# Patient Record
Sex: FEMALE | Race: WHITE | NOT HISPANIC OR LATINO | URBAN - NONMETROPOLITAN AREA
[De-identification: names, ages, dates, MRNs, and addresses within clinical notes are randomized per-mention and may not be internally consistent; named-entity substitution may affect disease eponyms.]

---

## 2023-09-22 ENCOUNTER — OFFICE VISIT (OUTPATIENT)
Dept: URBAN - NONMETROPOLITAN AREA CLINIC 13 | Facility: CLINIC | Age: 78
End: 2023-09-22

## 2023-10-06 ENCOUNTER — LAB OUTSIDE AN ENCOUNTER (OUTPATIENT)
Dept: URBAN - NONMETROPOLITAN AREA CLINIC 13 | Facility: CLINIC | Age: 78
End: 2023-10-06

## 2023-10-06 ENCOUNTER — OFFICE VISIT (OUTPATIENT)
Dept: URBAN - NONMETROPOLITAN AREA CLINIC 13 | Facility: CLINIC | Age: 78
End: 2023-10-06
Payer: MEDICARE

## 2023-10-06 VITALS
HEART RATE: 71 BPM | SYSTOLIC BLOOD PRESSURE: 145 MMHG | HEIGHT: 64 IN | DIASTOLIC BLOOD PRESSURE: 82 MMHG | BODY MASS INDEX: 30.39 KG/M2 | WEIGHT: 178 LBS

## 2023-10-06 DIAGNOSIS — K76.0 HEPATIC STEATOSIS: ICD-10-CM

## 2023-10-06 DIAGNOSIS — R17 ELEVATED BILIRUBIN: ICD-10-CM

## 2023-10-06 PROCEDURE — 99204 OFFICE O/P NEW MOD 45 MIN: CPT

## 2023-10-06 RX ORDER — AMLODIPINE BESYLATE 10 MG/1
1 TABLET TABLET ORAL ONCE A DAY
Qty: 30 | Status: ACTIVE | COMMUNITY
Start: 2023-10-06

## 2023-10-06 RX ORDER — ALLOPURINOL 100 MG/1
1 TABLET TABLET ORAL ONCE A DAY
Qty: 30 | Status: ACTIVE | COMMUNITY
Start: 2023-10-06 | End: 2023-11-05

## 2023-10-06 RX ORDER — TELMISARTAN 40 MG/1
1 TABLET TABLET ORAL ONCE A DAY
Qty: 30 | Status: ACTIVE | COMMUNITY
Start: 2023-10-06

## 2023-10-06 RX ORDER — ESOMEPRAZOLE MAGNESIUM 40 MG/1
TAKE ONE CAPSULE BY MOUTH EVERY DAY CAPSULE, DELAYED RELEASE ORAL
Qty: 90 EACH | Refills: 3 | Status: ACTIVE | COMMUNITY

## 2023-10-06 RX ORDER — ATORVASTATIN CALCIUM 40 MG/1
TAKE ONE TABLET BY MOUTH EVERY NIGHT AT BEDTIME TABLET, FILM COATED ORAL
Qty: 90 EACH | Refills: 0 | Status: ACTIVE | COMMUNITY

## 2023-10-06 RX ORDER — FLUTICASONE FUROATE AND VILANTEROL TRIFENATATE 100; 25 UG/1; UG/1
1 PUFF POWDER RESPIRATORY (INHALATION) ONCE A DAY
Status: ACTIVE | COMMUNITY
Start: 2023-10-06

## 2023-10-06 RX ORDER — MONTELUKAST 10 MG/1
TABLET ORAL
Qty: 90 TABLET | Status: ACTIVE | COMMUNITY

## 2023-10-06 NOTE — HPI-TODAY'S VISIT:
10/6/2023 Mrs. Anthony presents for evaluation of elevated bilirubin. She has a medical history of PINO, HTN and elevated triglycerides. Surgical history includes hysterectomy and appendectomy. Labs in July of this year showed bilirubin 1.4 . This has been elevated in the past and she was directed to consult with GI. Today we discussed continuing with additional labwork for evaluation.  SAFIA

## 2024-01-05 ENCOUNTER — OFFICE VISIT (OUTPATIENT)
Dept: URBAN - NONMETROPOLITAN AREA CLINIC 13 | Facility: CLINIC | Age: 79
End: 2024-01-05

## 2024-01-12 ENCOUNTER — OFFICE VISIT (OUTPATIENT)
Dept: URBAN - NONMETROPOLITAN AREA CLINIC 13 | Facility: CLINIC | Age: 79
End: 2024-01-12

## 2024-02-02 ENCOUNTER — OV EP (OUTPATIENT)
Dept: URBAN - NONMETROPOLITAN AREA CLINIC 13 | Facility: CLINIC | Age: 79
End: 2024-02-02
Payer: MEDICARE

## 2024-02-02 VITALS
HEIGHT: 64 IN | SYSTOLIC BLOOD PRESSURE: 143 MMHG | BODY MASS INDEX: 31.55 KG/M2 | WEIGHT: 184.8 LBS | DIASTOLIC BLOOD PRESSURE: 84 MMHG | HEART RATE: 82 BPM

## 2024-02-02 DIAGNOSIS — R74.8 ELEVATED LIVER ENZYMES: ICD-10-CM

## 2024-02-02 DIAGNOSIS — Z12.11 SCREENING FOR COLON CANCER: ICD-10-CM

## 2024-02-02 DIAGNOSIS — R17 ELEVATED BILIRUBIN: ICD-10-CM

## 2024-02-02 DIAGNOSIS — K76.0 HEPATIC STEATOSIS: ICD-10-CM

## 2024-02-02 PROBLEM — 305058001: Status: ACTIVE | Noted: 2024-02-02

## 2024-02-02 PROBLEM — 26165005: Status: ACTIVE | Noted: 2023-10-06

## 2024-02-02 PROBLEM — 707724006: Status: ACTIVE | Noted: 2024-02-02

## 2024-02-02 PROBLEM — 197321007: Status: ACTIVE | Noted: 2023-10-06

## 2024-02-02 PROCEDURE — 99213 OFFICE O/P EST LOW 20 MIN: CPT

## 2024-02-02 RX ORDER — ESOMEPRAZOLE MAGNESIUM 40 MG/1
TAKE ONE CAPSULE BY MOUTH EVERY DAY CAPSULE, DELAYED RELEASE ORAL
Qty: 90 EACH | Refills: 3 | Status: ACTIVE | COMMUNITY

## 2024-02-02 RX ORDER — FLUTICASONE FUROATE AND VILANTEROL TRIFENATATE 100; 25 UG/1; UG/1
1 PUFF POWDER RESPIRATORY (INHALATION) ONCE A DAY
Status: ACTIVE | COMMUNITY
Start: 2023-10-06

## 2024-02-02 RX ORDER — AMLODIPINE BESYLATE 10 MG/1
1 TABLET TABLET ORAL ONCE A DAY
Qty: 30 | Status: ACTIVE | COMMUNITY
Start: 2023-10-06

## 2024-02-02 RX ORDER — TELMISARTAN 40 MG/1
1 TABLET TABLET ORAL ONCE A DAY
Qty: 30 | Status: ACTIVE | COMMUNITY
Start: 2023-10-06

## 2024-02-02 RX ORDER — MONTELUKAST 10 MG/1
TABLET ORAL
Qty: 90 TABLET | Status: ACTIVE | COMMUNITY

## 2024-02-02 RX ORDER — ATORVASTATIN CALCIUM 40 MG/1
TAKE ONE TABLET BY MOUTH EVERY NIGHT AT BEDTIME TABLET, FILM COATED ORAL
Qty: 90 EACH | Refills: 0 | Status: ACTIVE | COMMUNITY

## 2024-02-02 NOTE — HPI-TODAY'S VISIT:
10/6/2023 Mrs. Anthony presents for evaluation of elevated bilirubin. She has a medical history of PINO, HTN and elevated triglycerides. Surgical history includes hysterectomy and appendectomy. Labs in July of this year showed bilirubin 1.4 . This has been elevated in the past and she was directed to consult with GI. Today we discussed continuing with additional labwork for evaluation.  SP  2/2/2024 Heaven returns to clinic for follow-up for fatty liver and elevated liver enzymes.  She did complete her ultrasound 12/13/2023 at [facility] which showed normal right upper quadrant ultrasound, homogeneous echogenicity of the liver.  She completed chronic hepatic serologies in October that were normal with only mildly elevated AST at 50, Tbili 1.4 Direct 0.2. Today she states in the past she had reversed fatty liver with weight loss however believes travel and liberalizing diet with weight gain caused this to return.  Today we discussed fatty liver recommendations and she will return in 6 months consider elastography.  Previous colonoscopy for screening for colon cancer was completed when she was 62 years old however she felt very sick due to prep and has elected to continue with routine Cologuard testing. SP

## 2024-06-05 ENCOUNTER — OFFICE VISIT (OUTPATIENT)
Dept: URBAN - NONMETROPOLITAN AREA CLINIC 2 | Facility: CLINIC | Age: 79
End: 2024-06-05

## 2024-08-02 ENCOUNTER — OFFICE VISIT (OUTPATIENT)
Dept: URBAN - NONMETROPOLITAN AREA CLINIC 13 | Facility: CLINIC | Age: 79
End: 2024-08-02

## 2024-09-27 ENCOUNTER — OFFICE VISIT (OUTPATIENT)
Dept: URBAN - NONMETROPOLITAN AREA CLINIC 13 | Facility: CLINIC | Age: 79
End: 2024-09-27

## 2024-09-27 ENCOUNTER — LAB OUTSIDE AN ENCOUNTER (OUTPATIENT)
Dept: URBAN - NONMETROPOLITAN AREA CLINIC 13 | Facility: CLINIC | Age: 79
End: 2024-09-27

## 2024-09-27 ENCOUNTER — DASHBOARD ENCOUNTERS (OUTPATIENT)
Age: 79
End: 2024-09-27

## 2024-09-27 VITALS
HEIGHT: 64 IN | DIASTOLIC BLOOD PRESSURE: 84 MMHG | SYSTOLIC BLOOD PRESSURE: 148 MMHG | HEART RATE: 74 BPM | WEIGHT: 172 LBS | BODY MASS INDEX: 29.37 KG/M2

## 2024-09-27 RX ORDER — AMLODIPINE BESYLATE 10 MG/1
1 TABLET TABLET ORAL ONCE A DAY
Qty: 30 | Status: ACTIVE | COMMUNITY
Start: 2023-10-06

## 2024-09-27 RX ORDER — MONTELUKAST 10 MG/1
TABLET ORAL
Qty: 90 TABLET | Status: ACTIVE | COMMUNITY

## 2024-09-27 RX ORDER — FLUTICASONE FUROATE AND VILANTEROL TRIFENATATE 100; 25 UG/1; UG/1
1 PUFF POWDER RESPIRATORY (INHALATION) ONCE A DAY
Status: ACTIVE | COMMUNITY
Start: 2023-10-06

## 2024-09-27 RX ORDER — ATORVASTATIN CALCIUM 40 MG/1
TAKE ONE TABLET BY MOUTH EVERY NIGHT AT BEDTIME TABLET, FILM COATED ORAL
Qty: 90 EACH | Refills: 0 | Status: ACTIVE | COMMUNITY

## 2024-09-27 RX ORDER — TELMISARTAN 40 MG/1
1 TABLET TABLET ORAL ONCE A DAY
Qty: 30 | Status: ACTIVE | COMMUNITY
Start: 2023-10-06

## 2024-09-27 RX ORDER — ESOMEPRAZOLE MAGNESIUM 40 MG/1
TAKE ONE CAPSULE BY MOUTH EVERY DAY CAPSULE, DELAYED RELEASE ORAL
Qty: 90 EACH | Refills: 3 | Status: ACTIVE | COMMUNITY

## 2024-09-27 NOTE — HPI-TODAY'S VISIT:
10/6/2023 Mrs. Anthony presents for evaluation of elevated bilirubin. She has a medical history of PINO, HTN and elevated triglycerides. Surgical history includes hysterectomy and appendectomy. Labs in July of this year showed bilirubin 1.4 . This has been elevated in the past and she was directed to consult with GI. Today we discussed continuing with additional labwork for evaluation.  SP  2/2/2024 Heaven returns to clinic for follow-up for fatty liver and elevated liver enzymes.  She did complete her ultrasound 12/13/2023 at [facility] which showed normal right upper quadrant ultrasound, homogeneous echogenicity of the liver.  She completed chronic hepatic serologies in October that were normal with only mildly elevated AST at 50, Tbili 1.4 Direct 0.2. Today she states in the past she had reversed fatty liver with weight loss however believes travel and liberalizing diet with weight gain caused this to return.  Today we discussed fatty liver recommendations and she will return in 6 months consider elastography.  Previous colonoscopy for screening for colon cancer was completed when she was 62 years old however she felt very sick due to prep and has elected to continue with routine Cologuard testing. SP  9/27/2024 Patient returns for follow up for elevated liver enzymes. Her Tbili is down to 1.1 on outside labs with other LFTs normal. Reports 2-3 MONTHS WEIGHT LOSS 15LBS even though she did go on a cruise. She continues healthy diet, exercise and avoiding alcohol other than rare glass of wine. She reports normal daily BMs with no blood in stool. Has no complaints today from a GI standpoint. Will repeat US fand she can return prn if normal. SP